# Patient Record
Sex: MALE | Race: WHITE | URBAN - METROPOLITAN AREA
[De-identification: names, ages, dates, MRNs, and addresses within clinical notes are randomized per-mention and may not be internally consistent; named-entity substitution may affect disease eponyms.]

---

## 2018-08-23 ENCOUNTER — TELEPHONE (OUTPATIENT)
Dept: BEHAVIORAL HEALTH | Facility: CLINIC | Age: 58
End: 2018-08-23

## 2018-08-23 ENCOUNTER — HOSPITAL ENCOUNTER (INPATIENT)
Facility: CLINIC | Age: 58
LOS: 2 days | Discharge: HOME | DRG: 885 | End: 2018-08-25
Attending: EMERGENCY MEDICINE | Admitting: PSYCHIATRY & NEUROLOGY
Payer: COMMERCIAL

## 2018-08-23 DIAGNOSIS — F41.9 ANXIETY: ICD-10-CM

## 2018-08-23 DIAGNOSIS — R45.851 SUICIDAL IDEATION: ICD-10-CM

## 2018-08-23 DIAGNOSIS — F10.10 ALCOHOL ABUSE, CONTINUOUS: ICD-10-CM

## 2018-08-23 DIAGNOSIS — F33.2 SEVERE EPISODE OF RECURRENT MAJOR DEPRESSIVE DISORDER, WITHOUT PSYCHOTIC FEATURES (H): ICD-10-CM

## 2018-08-23 LAB
AMPHETAMINES UR QL SCN: NEGATIVE
BARBITURATES UR QL: NEGATIVE
BENZODIAZ UR QL: NEGATIVE
CANNABINOIDS UR QL SCN: NEGATIVE
COCAINE UR QL: NEGATIVE
ETHANOL UR QL SCN: NEGATIVE
OPIATES UR QL SCN: NEGATIVE

## 2018-08-23 PROCEDURE — 99285 EMERGENCY DEPT VISIT HI MDM: CPT | Mod: 25 | Performed by: EMERGENCY MEDICINE

## 2018-08-23 PROCEDURE — 12400007 ZZH R&B MH INTERMEDIATE UMMC

## 2018-08-23 PROCEDURE — 80307 DRUG TEST PRSMV CHEM ANLYZR: CPT | Performed by: EMERGENCY MEDICINE

## 2018-08-23 PROCEDURE — 80320 DRUG SCREEN QUANTALCOHOLS: CPT | Performed by: EMERGENCY MEDICINE

## 2018-08-23 PROCEDURE — 99284 EMERGENCY DEPT VISIT MOD MDM: CPT | Mod: Z6 | Performed by: EMERGENCY MEDICINE

## 2018-08-23 PROCEDURE — 25000132 ZZH RX MED GY IP 250 OP 250 PS 637: Performed by: NURSE PRACTITIONER

## 2018-08-23 PROCEDURE — 90791 PSYCH DIAGNOSTIC EVALUATION: CPT

## 2018-08-23 RX ORDER — BUPROPION HYDROCHLORIDE 300 MG/1
300 TABLET ORAL DAILY
Status: ON HOLD | COMMUNITY
End: 2018-08-25

## 2018-08-23 RX ORDER — LISINOPRIL 20 MG/1
20 TABLET ORAL DAILY
Status: DISCONTINUED | OUTPATIENT
Start: 2018-08-23 | End: 2018-08-25 | Stop reason: HOSPADM

## 2018-08-23 RX ORDER — CITALOPRAM HYDROBROMIDE 20 MG/1
20 TABLET ORAL DAILY
Status: DISCONTINUED | OUTPATIENT
Start: 2018-08-24 | End: 2018-08-24

## 2018-08-23 RX ORDER — TRAZODONE HYDROCHLORIDE 50 MG/1
50 TABLET, FILM COATED ORAL
Status: DISCONTINUED | OUTPATIENT
Start: 2018-08-23 | End: 2018-08-25 | Stop reason: HOSPADM

## 2018-08-23 RX ORDER — DIPHENHYDRAMINE HCL 25 MG
25-50 CAPSULE ORAL
COMMUNITY

## 2018-08-23 RX ORDER — TRAZODONE HYDROCHLORIDE 50 MG/1
50 TABLET, FILM COATED ORAL AT BEDTIME
Status: DISCONTINUED | OUTPATIENT
Start: 2018-08-23 | End: 2018-08-25 | Stop reason: HOSPADM

## 2018-08-23 RX ORDER — LISINOPRIL 20 MG/1
20 TABLET ORAL DAILY
COMMUNITY

## 2018-08-23 RX ORDER — ALUMINA, MAGNESIA, AND SIMETHICONE 2400; 2400; 240 MG/30ML; MG/30ML; MG/30ML
30 SUSPENSION ORAL EVERY 4 HOURS PRN
Status: DISCONTINUED | OUTPATIENT
Start: 2018-08-23 | End: 2018-08-25 | Stop reason: HOSPADM

## 2018-08-23 RX ORDER — LANOLIN ALCOHOL/MO/W.PET/CERES
6 CREAM (GRAM) TOPICAL
Status: DISCONTINUED | OUTPATIENT
Start: 2018-08-23 | End: 2018-08-25 | Stop reason: HOSPADM

## 2018-08-23 RX ORDER — DIPHENHYDRAMINE HCL 25 MG
25-50 CAPSULE ORAL
Status: DISCONTINUED | OUTPATIENT
Start: 2018-08-23 | End: 2018-08-25 | Stop reason: HOSPADM

## 2018-08-23 RX ORDER — HYDROXYZINE HYDROCHLORIDE 25 MG/1
25 TABLET, FILM COATED ORAL EVERY 4 HOURS PRN
Status: DISCONTINUED | OUTPATIENT
Start: 2018-08-23 | End: 2018-08-25 | Stop reason: HOSPADM

## 2018-08-23 RX ORDER — CITALOPRAM HYDROBROMIDE 40 MG/1
40 TABLET ORAL EVERY MORNING
Status: ON HOLD | COMMUNITY
End: 2018-08-25

## 2018-08-23 RX ORDER — OLANZAPINE 10 MG/2ML
10 INJECTION, POWDER, FOR SOLUTION INTRAMUSCULAR
Status: DISCONTINUED | OUTPATIENT
Start: 2018-08-23 | End: 2018-08-25 | Stop reason: HOSPADM

## 2018-08-23 RX ORDER — OLANZAPINE 10 MG/1
10 TABLET ORAL
Status: DISCONTINUED | OUTPATIENT
Start: 2018-08-23 | End: 2018-08-25 | Stop reason: HOSPADM

## 2018-08-23 RX ORDER — BUPROPION HYDROCHLORIDE 150 MG/1
150 TABLET ORAL DAILY
Status: DISCONTINUED | OUTPATIENT
Start: 2018-08-24 | End: 2018-08-24

## 2018-08-23 RX ORDER — LORAZEPAM 0.5 MG/1
0.5 TABLET ORAL DAILY PRN
Status: ON HOLD | COMMUNITY
End: 2018-08-25

## 2018-08-23 RX ORDER — ACETAMINOPHEN 325 MG/1
650 TABLET ORAL EVERY 4 HOURS PRN
Status: DISCONTINUED | OUTPATIENT
Start: 2018-08-23 | End: 2018-08-25 | Stop reason: HOSPADM

## 2018-08-23 RX ADMIN — MELATONIN TAB 3 MG 6 MG: 3 TAB at 21:00

## 2018-08-23 RX ADMIN — LISINOPRIL 20 MG: 20 TABLET ORAL at 18:59

## 2018-08-23 RX ADMIN — TRAZODONE HYDROCHLORIDE 50 MG: 50 TABLET ORAL at 21:55

## 2018-08-23 ASSESSMENT — ACTIVITIES OF DAILY LIVING (ADL)
TRANSFERRING: 0-->INDEPENDENT
DRESS: SCRUBS (BEHAVIORAL HEALTH)
DRESS: 0-->INDEPENDENT
GROOMING: INDEPENDENT
TOILETING: 0-->INDEPENDENT
COGNITION: 0 - NO COGNITION ISSUES REPORTED
RETIRED_EATING: 0-->INDEPENDENT
AMBULATION: 0-->INDEPENDENT
SWALLOWING: 0-->SWALLOWS FOODS/LIQUIDS WITHOUT DIFFICULTY
RETIRED_COMMUNICATION: 0-->UNDERSTANDS/COMMUNICATES WITHOUT DIFFICULTY
ORAL_HYGIENE: INDEPENDENT
FALL_HISTORY_WITHIN_LAST_SIX_MONTHS: NO
BATHING: 0-->INDEPENDENT
LAUNDRY: WITH SUPERVISION

## 2018-08-23 NOTE — TELEPHONE ENCOUNTER
R:  Silviano/3B.  Notified unit @ 1:24 pm  Disposition @ 1:24 pm  Provider requesting pt placed on an emergency hold

## 2018-08-23 NOTE — ED NOTES
I have performed an in person assessment of the patient. Based on this assessment the patient no longer requires a one on one attendant at this point in time.    Elder Read MD  11:40 AM  August 23, 2018           Elder Read MD  08/23/18 1140

## 2018-08-23 NOTE — PROGRESS NOTES
[id number]   Patient Belongings   Did you bring any home meds/supplements to the hospital?  No   Patient Belongings cell phone/electronics;clothing;money (see comment);shoes;wallet   Belongings Search Yes   Clothing Search Yes   Second Staff Bill - Nurse      1. Black wallet  2. Brown belt  3. Black cell phone  4. Light brown/khaki cargo shorts  5. One  airplane ticket with paper casing    In Wallet:   Receipts   Business cards  [place] Drivers License  [employer] (buisness card)\  [employer] select club (card)  AAdvantage [employer] card  [place] gift card  Grey AAdvantage [employer] card  Mu[employer]oz [place] Card  Gold Ileana card  Triple AAA Card  Target gift card  Another Grey AAdvantage [employer] card  [employer] Blue card  Another triple AAA card  [employer]hta [employer] card (yellow)  Health partners card  Bconnect Marah card  Another Bconnected Marah card  Library Card  Blancadion Garduno Gold card    Money $33.68     (Moey and cards sent to security)     A               Admission:  I am responsible for any personal items that are not sent to the safe or pharmacy.  [facility] is not responsible for loss, theft or damage of any property in my possession.    Signature:  _________________________________ Date: _______  Time: _____                                              Staff Signature:  ____________________________ Date: ________  Time: _____      2nd Staff person, if patient is unable/unwilling to sign:    Signature: ________________________________ Date: ________  Time: _____     Discharge:  [facility] has returned all of my personal belongings:    Signature: _________________________________ Date: ________  Time: _____                                          Staff Signature:  ____________________________ Date: ________  Time: _____
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
## 2018-08-23 NOTE — PROGRESS NOTES
PT arrives on 3bw for admission. Pt has been feeling suicidal. Pt. Physical being and belongings checked in. Pt vitals, T 99.1, HR 67, /90, R 16. Report given to sandra ullao.

## 2018-08-23 NOTE — IP AVS SNAPSHOT
[facility]    [address]    [place] [address]    Phone:  [phone]                                       After Visit Summary   8/23/2018    Blanca Garduno    MRN: [id number]           After Visit Summary Signature Page     I have received my discharge instructions, and my questions have been answered. I have discussed any challenges I see with this plan with the nurse or doctor.    ..........................................................................................................................................  Patient/Patient Representative Signature      ..........................................................................................................................................  Patient Representative Print Name and Relationship to Patient    ..................................................               ................................................  Date                                            Time    ..........................................................................................................................................  Reviewed by Signature/Title    ...................................................              ..............................................  Date                                                            Time          22EPIC Rev 08/18

## 2018-08-23 NOTE — ED NOTES
Bed: ED11  Expected date: 8/23/18  Expected time: 11:10 AM  Means of arrival:   Comments:  Leanne 595 59yo M si on hold

## 2018-08-23 NOTE — IP AVS SNAPSHOT
MRN:[id number]                      After Visit Summary   8/23/2018    Blanca Garduno    MRN: [id number]           Thank you!     Thank you for choosing [facility] for your care. Our goal is always to provide you with excellent care.        Patient Information     Date Of Birth          1960        Designated Caregiver       Most Recent Value    Caregiver    Will someone help with your care after discharge? yes    Name of designated caregiver More    Phone number of caregiver [phone]    Caregiver address [place], [place]      About your hospital stay     You were admitted on:  August 23, 2018 [facility]    You were discharged on:  August 25, 2018       Who to Call     For medical emergencies, please call 911.  For non-urgent questions about your medical care, please call your primary care provider or clinic, [phone]          Attending Provider     Provider Specialty    Elder Read MD Emergency Medicine    [facility], Alden Duncan MD Psychiatry       Primary Care Provider Office Phone # Fax #    Kendell Jeancarlos Cristobal [phone]      Additional Services     Medication Therapy Management Referral       MTM referral reason            antidepressants: 3 or more prescribed     This service is designed to help you get the most from your medications.  A specially trained pharmacist will work closely with you and your doctors  to solve any problems related to your medications and to help you get the   best results from taking them.      The Medication Therapy Management staff will call you to schedule an appointment.                  Further instructions from your care team        Behavioral Discharge Planning and Instructions      Summary:  You were admitted on 8/23/2018  due to Suicidal Ideations.  You were treated by Dr. Alden Velasquez MD and discharged on 08/25/18 from [facility] to Home      Principal Diagnosis:  Severe episode of  recurrent major depressive disorder, without psychotic features (H)      Health Care Follow-up Appointments:   Because you are being discharged on a weekend day, you do not have any follow up appointments in place.  You are encouraged to follow up with your provider Daxa Sandy within the next two weeks for a medication evaluation follow up.      It is also strongly recommended that you commence work with an outpatient mental health therapist. At the time you schedule the follow up appointment with your medication manager, you can request an appointment with this type of provider as well.     PLEASE FOLLOW UP WITH YOUR PCP TO HAVE A FASTING LIPID PANEL REPEATED IN 1-2 WEEKS.       Lifestyle Adjustment:   1. Adjust your lifestyle to get enough sleep, relaxation, exercise and good nutrition.  Continue to develop healthy coping skills to decrease stress and promote a healthy and sober lifestyle.  2. Abstain from all substances of abuse.  3. Take medications as prescribed.  Please work with your doctor to discuss any concerns you have with your medications or side effects you may be experiencing.  4. Follow up with appointments as scheduled.      General Medication Instructions:   See your medication sheet(s) for instructions.   Take all medicines as directed.  Make no changes unless your doctor suggests them.   Go to all your doctor visits.  Be sure to have all your required lab tests.     Major Treatments, Procedures and Findings:  You were provided with: a psychiatric assessment, assessed for medical stability, medication evaluation and/or management, group therapy and milieu management    Symptoms to Report: feeling more aggressive, increased confusion, losing more sleep, mood getting worse or thoughts of suicide    Early warning signs can include: increased depression or anxiety sleep disturbances increased thoughts or behaviors of suicide or self-harm  increased unusual thinking, such  "as paranoia or hearing voices    Safety and Wellness:  Take all medicines as directed.  Make no changes unless your doctor suggests them.      Follow treatment recommendations.  Refrain from alcohol and non-prescribed drugs.  Ask your support system to help you reduce your access to items that could harm yourself or others. If there is a concern for safety, call 911.    Resources:   Crisis Intervention: 995.120.7238 or 465-582-8239 (TTY: 694.119.2549).  Call anytime for help.  National [place] on Mental Illness (www.[place].luciana.org): 459.865.7310 or 913-422-8868.  Alcoholics Anonymous (www.alcoholics-anonymous.org): Check your phone book for your local chapter.  Suicide Awareness Voices of Education (SAVE) (www.save.org): 456-059-QWOE (4652)  National Suicide Prevention Line (www.mentalhealthmn.org): 336-499-JVSM (9273)  Mental Health Consumer/Survivor Network of [place] (www.mhcsn.net): 672.754.3175 or 931-813-7543  Mental Health Association of [place] (www.mentalhealth.org): 768.730.1938 or 429-302-9239  Self- Management and Recovery Training., [employer]-- Toll free: 531.874.8825  www.Zillow.org  [facility] Crisis Response 369 026-0312  Text 4 Life: txt \"LIFE\" to 96800 for immediate support and crisis intervention  Crisis text line: Text \"[place]\" to 554975. Free, confidential, 24/7.  Crisis Intervention: 836.646.1879 or 297-439-9673. Call anytime for help.     The treatment team has appreciated the opportunity to work with you.     If you have any questions or concerns our unit number is [phone]             Pending Results     Date and Time Order Name Status Description    8/24/2018 1158 EKG 12-lead, complete Preliminary             Statement of Approval     Ordered          08/25/18 0834  I have reviewed and agree with all the recommendations and orders detailed in this document.  EFFECTIVE NOW     Approved and electronically signed by:  Alden Shore MD             Admission Information     Date & Time " "Provider Department Dept. Phone    2018 [facility] [phone]      Your Vitals Were     Blood Pressure Pulse Temperature Respirations Height Weight    126/79 54 96.9  F (36.1  C) (Tympanic) 16 1.778 m (5' 10\") 93.3 kg (205 lb 11.2 oz)    Pulse Oximetry BMI (Body Mass Index)                95% 29.51 kg/m2          [employer] Information     [employer] lets you send messages to your doctor, view your test results, renew your prescriptions, schedule appointments and more. To sign up, go to [email or web address] . Click on \"Log in\" on the left side of the screen, which will take you to the Welcome page. Then click on \"Sign up Now\" on the right side of the page.     You will be asked to enter the access code listed below, as well as some personal information. Please follow the directions to create your username and password.     Your access code is: [email or web address]  Expires: 2018  8:48 AM     Your access code will  in 90 days. If you need help or a new code, please call your [facility] clinic or [phone].        Care EveryWhere ID     This is your Care EveryWhere ID. This could be used by other organizations to access your [facility] medical records  [id number]        Equal Access to Services     JEANETH ARENAS : Hadii bran Hathaway, waaxda lulashelladaha, qaybta kaalherbert lincoln, rohini garzon . [facility] [phone].    ATENCIÓN: Si habla español, tiene a peng disposición servicios gratuitos de asistencia lingüística. Toyin al [phone].    We comply with applicable federal civil rights laws and [place] laws. We do not discriminate on the basis of race, color, national origin, age, disability, sex, sexual orientation, or gender identity.               Review of your medicines      START taking        Dose / Directions    cholecalciferol 2000 units tablet   Used for:  Severe episode of recurrent major depressive disorder, without psychotic features " (H)        Dose:  2000 Units   Take 2,000 Units by mouth daily   Quantity:  30 tablet   Refills:  1       FLUoxetine 20 MG capsule   Commonly known as:  PROzac   Used for:  Severe episode of recurrent major depressive disorder, without psychotic features (H)        Dose:  20 mg   Take 1 capsule (20 mg) by mouth daily   Quantity:  60 capsule   Refills:  1       melatonin 3 MG tablet        Dose:  6 mg   Take 2 tablets (6 mg) by mouth nightly as needed for sleep   Refills:  0       traZODone 50 MG tablet   Commonly known as:  DESYREL   Used for:  Severe episode of recurrent major depressive disorder, without psychotic features (H)        Dose:  50 mg   Take 1 tablet (50 mg) by mouth At Bedtime   Quantity:  30 tablet   Refills:  1         CONTINUE these medicines which have NOT CHANGED        Dose / Directions    diphenhydrAMINE 25 MG capsule   Commonly known as:  BENADRYL        Dose:  25-50 mg   Take 25-50 mg by mouth nightly as needed for sleep   Refills:  0       lisinopril 20 MG tablet   Commonly known as:  PRINIVIL/ZESTRIL        Dose:  20 mg   Take 20 mg by mouth daily   Refills:  0         STOP taking     buPROPion 300 MG 24 hr tablet   Commonly known as:  WELLBUTRIN XL           citalopram 40 MG tablet   Commonly known as:  celeXA           LORazepam 0.5 MG tablet   Commonly known as:  ATIVAN                Where to get your medicines      These medications were sent to [facility] [place], [place] - [address]  [address] [address]     Phone:  [phone]     cholecalciferol 2000 units tablet    FLUoxetine 20 MG capsule    traZODone 50 MG tablet                Protect others around you: Learn how to safely use, store and throw away your medicines at www.disposemymeds.org.             Medication List: This is a list of all your medications and when to take them. Check marks below indicate your daily home schedule. Keep this list as a reference.      Medications            Morning Afternoon Evening Bedtime As Needed    cholecalciferol 2000 units tablet   Take 2,000 Units by mouth daily   Last time this was given:  2,000 Units on 8/25/2018  8:44 AM                                   diphenhydrAMINE 25 MG capsule   Commonly known as:  BENADRYL   Take 25-50 mg by mouth nightly as needed for sleep                                   FLUoxetine 20 MG capsule   Commonly known as:  PROzac   Take 1 capsule (20 mg) by mouth daily   Last time this was given:  20 mg on 8/25/2018  8:44 AM                                   lisinopril 20 MG tablet   Commonly known as:  PRINIVIL/ZESTRIL   Take 20 mg by mouth daily   Last time this was given:  20 mg on 8/24/2018  9:20 PM                                   melatonin 3 MG tablet   Take 2 tablets (6 mg) by mouth nightly as needed for sleep   Last time this was given:  6 mg on 8/24/2018  9:20 PM                                   traZODone 50 MG tablet   Commonly known as:  DESYREL   Take 1 tablet (50 mg) by mouth At Bedtime   Last time this was given:  50 mg on 8/24/2018 10:04 PM

## 2018-08-23 NOTE — ED NOTES
ED to Behavioral Floor Handoff    SITUATION  Blanca Garduno is a 58 year old male who speaks English and lives in a home with family members The patient arrived in the ED by ambulance from MD office with a complaint of Suicidal (arrived via ems. pt has been dealing w depression for 38 years. since 2008, pt has had major depression after deaths in family.  [facility] and had bad experience with [facility]. this kept him from following up with mental health issues. therapy isnt currently helping. Two months ago, SI became strong, plan to OD on pills. Pt's new psychiatrist put pt on a transport hold to be seen here.)  .The patient's current symptoms started/worsened 2 month(s) ago and during this time the symptoms have increased.   In the ED, pt was diagnosed with   Final diagnoses:   Suicidal ideation   Severe episode of recurrent major depressive disorder, without psychotic features (H)        Initial vitals were: BP: (!) 155/102  Pulse: 73  Temp: 97.6  F (36.4  C)  Resp: 20  SpO2: 96 %   --------  Is the patient diabetic? No   If yes, last blood glucose? --     If yes, was this treated in the ED? --  --------  Is the patient inebriated (ETOH) No or Impaired on other substances? No  MSSA done? N/A  Last MSSA score: --    Were withdrawal symptoms treated? N/A  Does the patient have a seizure history? No. If yes, date of most recent seizure--  --------  Is the patient patient experiencing suicidal ideation? Pt having SI with plan to overdose on pills    Homicidal ideation? denies current or recent homicidal ideation or behaviors.    Self-injurious behavior/urges? denies current or recent self injurious behavior or ideation.  ------  Was pt aggressive in the ED No  Was a code called No  Is the pt now cooperative? Yes  -------  Meds given in ED: Medications - No data to display   Family present during ED course? No  Family currently present? No    BACKGROUND  Does the patient have a cognitive impairment or  developmental disability? No  Allergies: No Known Allergies.   Social demographics are   Social History     Social History     Marital status:      Spouse name: N/A     Number of children: N/A     Years of education: N/A     Social History Main Topics     Smoking status: Never Smoker     Smokeless tobacco: Never Used     Alcohol use Yes      Comment: 2-4 beers daily     Drug use: No     Sexual activity: Not Asked     Other Topics Concern     None     Social History Narrative        ASSESSMENT  Labs results   Labs Ordered and Resulted from Time of ED Arrival Up to the Time of Departure from the ED   DRUG ABUSE SCREEN 6 CHEM DEP URINE ([facility])      Imaging Studies: No results found for this or any previous visit (from the past 24 hour(s)).   Most recent vital signs BP (!) 155/102  Pulse 73  Temp 97.6  F (36.4  C) (Oral)  Resp 20  SpO2 96%   Abnormal labs/tests/findings requiring intervention:---   Pain control: pt had none  Nausea control: pt had none    RECOMMENDATION  Are any infection precautions needed (MRSA, VRE, etc.)? No If yes, what infection? --  ---  Does the patient have mobility issues? independently. If yes, what device does the pt use? ---  ---  Is patient on 72 hour hold or commitment? Yes If on 72 hour hold, have hold and rights been given to patient? Yes  Are admitting orders written if after 10 p.m. ?N/A  Tasks needing to be completed:---     Angelica Hazel   [facility] [phone] [place] [facility]   [phone] [facility]

## 2018-08-23 NOTE — PHARMACY-ADMISSION MEDICATION HISTORY
Admission medication history interview status for the 8/23/2018 admission is complete. See Epic admission navigator for allergy information, pharmacy, prior to admission medications and immunization status.     Medication history interview sources:  patient, [facility], [place] , [facility] in [place] [phone]    Changes made to PTA medication list (reason)  Added: Benadryl PRN  Deleted: none  Changed: added instructions for the bupropion, citalopram, lisinopril, and Ativan    Additional medication history information (including reliability of information, actions taken by pharmacist):  -Patient was a good historian.  -Verified all medications with [facility] besides the Benadryl that he buys over the counter.   -[place] : Ativan 0.5mg tablet last filled on 7/13/18 for #10, 10 days supply.    Prior to Admission medications    Medication Sig Last Dose Taking? Auth Provider   buPROPion (WELLBUTRIN XL) 300 MG 24 hr tablet Take 300 mg by mouth daily 8/18/2018 at Unknown time Yes Unknown, Entered By History   citalopram (CELEXA) 40 MG tablet Take 40 mg by mouth every morning 8/18/2018 at Unknown time Yes Unknown, Entered By History   diphenhydrAMINE (BENADRYL) 25 MG capsule Take 25-50 mg by mouth nightly as needed for sleep 8/22/2018 at 2200 Yes Unknown, Entered By History   lisinopril (PRINIVIL/ZESTRIL) 20 MG tablet Take 20 mg by mouth daily 8/18/2018 at Unknown time Yes Unknown, Entered By History   LORazepam (ATIVAN) 0.5 MG tablet Take 0.5 mg by mouth daily as needed (for flying) Past Month at Unknown time Yes Unknown, Entered By History     Medication history completed by:   Silke Cramer, PharmD, BCPS

## 2018-08-23 NOTE — TELEPHONE ENCOUNTER
S: Pt is a 59 yo male in the [facility] ED for SI    B: Pt recently started OP and [facility] through [employer]. Did a tele med conference and he disclosed that he is suicidal. Pt states that he's been suicidal for the past couple of years, even wrote suicide notes to family but never disclosed them. Pt currently wants to overdose on medications. Pt was a [facility] about 3 years ago for similar issues. Pt is Wellbutrin and Celexa. Pt drinks 4 beers daily and sleeps about 16 hours daily. Pt initially didn't want admission and was place on a hold. Pt now signed in voluntarily. Pt is calm and cooperative.     A: Pt is voluntary. Medically cleared

## 2018-08-24 LAB
ALBUMIN SERPL-MCNC: 3.9 G/DL (ref 3.4–5)
ALBUMIN UR-MCNC: NEGATIVE MG/DL
ALP SERPL-CCNC: 58 U/L (ref 40–150)
ALT SERPL W P-5'-P-CCNC: 25 U/L (ref 0–70)
ANION GAP SERPL CALCULATED.3IONS-SCNC: 8 MMOL/L (ref 3–14)
APPEARANCE UR: CLEAR
AST SERPL W P-5'-P-CCNC: 18 U/L (ref 0–45)
BASOPHILS # BLD AUTO: 0 10E9/L (ref 0–0.2)
BASOPHILS NFR BLD AUTO: 0.7 %
BILIRUB SERPL-MCNC: 0.6 MG/DL (ref 0.2–1.3)
BILIRUB UR QL STRIP: NEGATIVE
BUN SERPL-MCNC: 15 MG/DL (ref 7–30)
CALCIUM SERPL-MCNC: 9 MG/DL (ref 8.5–10.1)
CHLORIDE SERPL-SCNC: 108 MMOL/L (ref 94–109)
CHOLEST SERPL-MCNC: 193 MG/DL
CO2 SERPL-SCNC: 27 MMOL/L (ref 20–32)
COLOR UR AUTO: YELLOW
CREAT SERPL-MCNC: 0.97 MG/DL (ref 0.66–1.25)
DEPRECATED CALCIDIOL+CALCIFEROL SERPL-MC: 17 UG/L (ref 20–75)
DIFFERENTIAL METHOD BLD: NORMAL
EOSINOPHIL # BLD AUTO: 0.1 10E9/L (ref 0–0.7)
EOSINOPHIL NFR BLD AUTO: 1.5 %
ERYTHROCYTE [DISTWIDTH] IN BLOOD BY AUTOMATED COUNT: 12.8 % (ref 10–15)
FOLATE SERPL-MCNC: 26 NG/ML
GFR SERPL CREATININE-BSD FRML MDRD: 79 ML/MIN/1.7M2
GLUCOSE SERPL-MCNC: 87 MG/DL (ref 70–99)
GLUCOSE UR STRIP-MCNC: NEGATIVE MG/DL
HCT VFR BLD AUTO: 42 % (ref 40–53)
HDLC SERPL-MCNC: 50 MG/DL
HGB BLD-MCNC: 14 G/DL (ref 13.3–17.7)
HGB UR QL STRIP: NEGATIVE
HYALINE CASTS #/AREA URNS LPF: 2 /LPF (ref 0–2)
IMM GRANULOCYTES # BLD: 0 10E9/L (ref 0–0.4)
IMM GRANULOCYTES NFR BLD: 0.3 %
KETONES UR STRIP-MCNC: NEGATIVE MG/DL
LDLC SERPL CALC-MCNC: 116 MG/DL
LEUKOCYTE ESTERASE UR QL STRIP: NEGATIVE
LYMPHOCYTES # BLD AUTO: 2 10E9/L (ref 0.8–5.3)
LYMPHOCYTES NFR BLD AUTO: 32.8 %
MCH RBC QN AUTO: 29.7 PG (ref 26.5–33)
MCHC RBC AUTO-ENTMCNC: 33.3 G/DL (ref 31.5–36.5)
MCV RBC AUTO: 89 FL (ref 78–100)
MONOCYTES # BLD AUTO: 0.5 10E9/L (ref 0–1.3)
MONOCYTES NFR BLD AUTO: 7.7 %
MUCOUS THREADS #/AREA URNS LPF: PRESENT /LPF
NEUTROPHILS # BLD AUTO: 3.4 10E9/L (ref 1.6–8.3)
NEUTROPHILS NFR BLD AUTO: 57 %
NITRATE UR QL: NEGATIVE
NONHDLC SERPL-MCNC: 143 MG/DL
NRBC # BLD AUTO: 0 10*3/UL
NRBC BLD AUTO-RTO: 0 /100
PH UR STRIP: 6 PH (ref 5–7)
PLATELET # BLD AUTO: 228 10E9/L (ref 150–450)
POTASSIUM SERPL-SCNC: 4.1 MMOL/L (ref 3.4–5.3)
PROT SERPL-MCNC: 7.3 G/DL (ref 6.8–8.8)
RBC # BLD AUTO: 4.71 10E12/L (ref 4.4–5.9)
RBC #/AREA URNS AUTO: 2 /HPF (ref 0–2)
SODIUM SERPL-SCNC: 143 MMOL/L (ref 133–144)
SOURCE: ABNORMAL
SP GR UR STRIP: 1.01 (ref 1–1.03)
SQUAMOUS #/AREA URNS AUTO: <1 /HPF (ref 0–1)
TRIGL SERPL-MCNC: 133 MG/DL
TSH SERPL DL<=0.005 MIU/L-ACNC: 1.58 MU/L (ref 0.4–4)
UROBILINOGEN UR STRIP-MCNC: NORMAL MG/DL (ref 0–2)
VIT B12 SERPL-MCNC: 511 PG/ML (ref 193–986)
WBC # BLD AUTO: 6 10E9/L (ref 4–11)
WBC #/AREA URNS AUTO: 1 /HPF (ref 0–5)

## 2018-08-24 PROCEDURE — 80061 LIPID PANEL: CPT | Performed by: NURSE PRACTITIONER

## 2018-08-24 PROCEDURE — 99207 ZZC CONSULT E&M CHANGED TO INITIAL LEVEL: CPT | Performed by: PHYSICIAN ASSISTANT

## 2018-08-24 PROCEDURE — 99221 1ST HOSP IP/OBS SF/LOW 40: CPT | Performed by: PHYSICIAN ASSISTANT

## 2018-08-24 PROCEDURE — 12400007 ZZH R&B MH INTERMEDIATE UMMC

## 2018-08-24 PROCEDURE — G0177 OPPS/PHP; TRAIN & EDUC SERV: HCPCS

## 2018-08-24 PROCEDURE — 25000132 ZZH RX MED GY IP 250 OP 250 PS 637: Performed by: NURSE PRACTITIONER

## 2018-08-24 PROCEDURE — 36415 COLL VENOUS BLD VENIPUNCTURE: CPT | Performed by: NURSE PRACTITIONER

## 2018-08-24 PROCEDURE — 80053 COMPREHEN METABOLIC PANEL: CPT | Performed by: NURSE PRACTITIONER

## 2018-08-24 PROCEDURE — 82306 VITAMIN D 25 HYDROXY: CPT | Performed by: NURSE PRACTITIONER

## 2018-08-24 PROCEDURE — 84443 ASSAY THYROID STIM HORMONE: CPT | Performed by: NURSE PRACTITIONER

## 2018-08-24 PROCEDURE — 85025 COMPLETE CBC W/AUTO DIFF WBC: CPT | Performed by: NURSE PRACTITIONER

## 2018-08-24 PROCEDURE — 81001 URINALYSIS AUTO W/SCOPE: CPT | Performed by: NURSE PRACTITIONER

## 2018-08-24 PROCEDURE — 82607 VITAMIN B-12: CPT | Performed by: NURSE PRACTITIONER

## 2018-08-24 PROCEDURE — 25000132 ZZH RX MED GY IP 250 OP 250 PS 637: Performed by: PHYSICIAN ASSISTANT

## 2018-08-24 PROCEDURE — 25000132 ZZH RX MED GY IP 250 OP 250 PS 637: Performed by: PSYCHIATRY & NEUROLOGY

## 2018-08-24 PROCEDURE — 99222 1ST HOSP IP/OBS MODERATE 55: CPT | Mod: AI | Performed by: PSYCHIATRY & NEUROLOGY

## 2018-08-24 PROCEDURE — 93005 ELECTROCARDIOGRAM TRACING: CPT

## 2018-08-24 PROCEDURE — 82746 ASSAY OF FOLIC ACID SERUM: CPT | Performed by: NURSE PRACTITIONER

## 2018-08-24 PROCEDURE — 93010 ELECTROCARDIOGRAM REPORT: CPT | Performed by: INTERNAL MEDICINE

## 2018-08-24 RX ADMIN — MELATONIN TAB 3 MG 6 MG: 3 TAB at 21:20

## 2018-08-24 RX ADMIN — ACETAMINOPHEN 650 MG: 325 TABLET, FILM COATED ORAL at 14:35

## 2018-08-24 RX ADMIN — LISINOPRIL 20 MG: 20 TABLET ORAL at 21:20

## 2018-08-24 RX ADMIN — TRAZODONE HYDROCHLORIDE 50 MG: 50 TABLET ORAL at 22:04

## 2018-08-24 RX ADMIN — FLUOXETINE 20 MG: 20 CAPSULE ORAL at 11:20

## 2018-08-24 ASSESSMENT — ACTIVITIES OF DAILY LIVING (ADL)
ORAL_HYGIENE: INDEPENDENT
GROOMING: INDEPENDENT
DRESS: INDEPENDENT

## 2018-08-24 NOTE — PROGRESS NOTES
"Pt's pulse noted to be in the 50's this shift ( both by machine and apical). Lisinopril was held until order parameters received from medical team. Per Rod/PA there is no need for pulse parameter on this medication. Blood pressure parameter in place. Lisinopril was rescheduled for HS as this is when he had his dose yesterday.   Pt reports that he is unaware of having a history of a low pulse and denies symptoms.  Pt is currently in the conference room meeting with his daughters and wife.     Pt identifies \"I was in a pretty crappy mood last night\". Pt reports mood has improved. Pt enjoyed visit from his wife and daughters early this afternoon.    1430- Pt requested and received prn tylenol 650 mg for neck pain. Pt believes he may have slept wrong. Pt denies current SI/SIB and hopes to go home tomorrow.   "
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
## 2018-08-24 NOTE — PROGRESS NOTES
Pt reported having a good day and experiencing a 2/10 on depression (10 being the worst). Pt said yesterday, the day he was admitted, was a really bad day and he was not doing well. Pt said today was a much better day. Pt denied SI/SIB and showed insight into his illness. Pt hopes to discharge tomorrow.         08/24/18 1506   Behavioral Health   Hallucinations denies / not responding to hallucinations   Thinking distractable   Orientation time: oriented;date: oriented;place: oriented;person: oriented   Memory baseline memory   Insight admits / accepts   Eye Contact at examiner   Affect full range affect   Mood mood is calm   Physical Appearance/Attire disheveled   Hygiene (adequate)   1. Wish to be Dead No   2. Non-Specific Active Suicidal Thoughts  No

## 2018-08-24 NOTE — PLAN OF CARE
"Problem: Patient Care Overview  Goal: Individualization & Mutuality  Personal Plan of Care    Reasons you are in the Hospital  1.  Severe Depression  2.  Thoughts of suicide  3.  4.    Goals for Discharge   1.  Get on medication that will be effective  2.  Develop strategies to deal with \"Sudden crashes\"  3.      s      "

## 2018-08-24 NOTE — PLAN OF CARE
Problem: Patient Care Overview  Goal: Team Discussion  Team Plan:    BEHAVIORAL TEAM DISCUSSION    Continued Stay Criteria/Rationale: Patient admitted on 72 hour hold due to suicidal ideation  Plan: Psychiatric Assessment. Medication Management. Therapeutic Mileu. Individual and group support.  Participants: Alden Shore MD, Pearl Garrido RN, Mónica Stanford OT  Summary/Recommendation: The plan is to assess the patient for mental health and medication needs.  The patient will be prescribed medications to treat the identified symptoms.  Upon discharge the patient will be referred to services as appropriate based on the assessment.  Medical/Physical: Per internal med consult  Progress: Initial assessment  Precautions:   Behavioral Orders   Procedures     Code 1 - Restrict to Unit     Routine Programming     As clinically indicated     Status 15     Every 15 minutes.     Suicide precautions     Patients on Suicide Precautions should have a Combination Diet ordered that includes a Diet selection(s) AND a Behavioral Tray selection for Safe Tray - with utensils, or Safe Tray - NO utensils               Problem: General Plan of Care (Inpatient Behavioral)  Goal: Team Discussion  Team Plan:    BEHAVIORAL TEAM DISCUSSION    Continued Stay Criteria/Rationale: Patient admitted on 72 hour hold due to suicidal ideation  Plan: Psychiatric Assessment. Medication Management. Therapeutic Mileu. Individual and group support.  Participants: Alden Shore MD, Pearl Garrido RN, Seble Pfeiffer and Mónica Monique OT  Summary/Recommendation: The plan is to assess the patient for mental health and medication needs.  The patient will be prescribed medications to treat the identified symptoms.  Upon discharge the patient will be referred to services as appropriate based on the assessment.  Medical/Physical: Per internal med consult  Progress: Initial assessment  Precautions:   Behavioral Orders    Procedures     Code 1 - Restrict to Unit     Routine Programming     As clinically indicated     Status 15     Every 15 minutes.     Suicide precautions     Patients on Suicide Precautions should have a Combination Diet ordered that includes a Diet selection(s) AND a Behavioral Tray selection for Safe Tray - with utensils, or Safe Tray - NO utensils

## 2018-08-24 NOTE — PLAN OF CARE
Problem: Occupational Therapy Goals (Adult)  Goal: Occupational Therapy Goals  Will participate in setting OT goals     Attended 1 of 3 OT groups. He was quick to be involved, pleasant and social with others. He learned and participated in an activity focused on utilizing problem solving skills using visuospatial concepts. With occasional cues he followed through successfully in finding solutions and becam quicker in problem solving with independence. Pt will be given to complete a written self assessment. OT purpose was explained with the value of having involvement in treatment plan, and provided options to meet self identified goals. Will assess further and set OT goals with additional attendance.

## 2018-08-24 NOTE — PLAN OF CARE
Problem: General Plan of Care (Inpatient Behavioral)  Goal: Individualization/Patient Specific Goal (IP Behavioral)  The patient and/or their representative will achieve their patient-specific goals related to the plan of care.    The patient-specific goals include:  Illness Management Recovery model: Objectives    Patient will identify reason(s) for hospitalization from their perspective.  Patient will identify a minimum of three goals for discharge.  Patient will identify a minimum of three triggers that may increase their symptoms.  Patient will identify a minimum of three coping skills they can do to stay well.   Patient will identify their support system to demonstrate readiness for discharge.  Illness Management Recovery model:  Feedback.    Patient identified the following people they would like to receive feedback from if/when they see early warning signs:    Friend(s)     Family(s):     Partner/Spouse: Wife More    Support Group Member(s):  Therapist    Co-Worker(s):
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
## 2018-08-24 NOTE — CONSULTS
Consult Date:  08/24/2018      HISTORY OF PRESENT ILLNESS:  The patient is a 58-year-old male admitted to station 3B for worsening depression and suicidal ideation.  An Internal Medicine consultation was ordered by Dr. Shore to assess medical problems including hypertension.  At this time, the patient denies acute physical concerns including fever, chills, chest pain, shortness of breath, abdominal pain, nausea, bowel or bladder concerns.      PAST MEDICAL HISTORY:   1.  Depression and other psych conditions per Dr. Shore.     2.  Hypertension.   3.  Denies a history of other chronic medical problems including cardiopulmonary disease and diabetes.      PAST SURGICAL HISTORY:  None.      ADMISSION MEDICATIONS:  Reviewed and listed in the medication reconciliation list.      ALLERGIES:  NO KNOWN DRUG ALLERGIES.      SOCIAL HISTORY:  .  Two children.  Lives in [place].  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  Denies smoking cigarettes.  Denies any problem with alcohol or drugs.      FAMILY HISTORY:  Reviewed and noncontributory.      REVIEW OF SYSTEMS:  Ten-point review of systems negative except as stated above in the history of present illness.      PHYSICAL EXAMINATION:   GENERAL:  Pleasant gentleman in no acute distress.   VITAL SIGNS:  Stable.  Pulse in the mid-50s.   HEENT:  Negative.   NECK:  Supple.  No cervical lymphadenopathy or thyromegaly.   LUNGS:  Clear.   CARDIOVASCULAR:  Bradycardic yet regular, no murmurs appreciated.   ABDOMEN:  Soft, nontender.   EXTREMITIES:  No edema.   SKIN:  No rashes noted on exposed areas.   NEUROLOGIC:  He is awake, alert and oriented x 3.  Motor strength is symmetric.  He is not tremulous.      LABORATORY DATA:  .  Otherwise, CBC, comprehensive metabolic panel, rest of the lipid panel and TSH are essentially normal.  Urine drug screen negative.      IMPRESSION:   1.  Depression per Dr. Shore.   2.  Hypertension, stable.   3.  Bradycardia, likely physiologic, patient  asymptomatic.   4.  Mild dyslipidemia.      PLAN:  Will obtain a routine baseline EKG given the patient's recurrent bradycardia despite him being asymptomatic.  His blood pressures will be monitored closely.  Continue with prior to admission lisinopril as ordered with parameters to hold.  No other medical intervention indicated at this time.  The patient is medically stable.  At the time of discharge, he should be instructed to follow up with his family physician in 1-2 weeks for a repeat fasting lipid panel.   I will be happy to follow up and see him again in the interim for any intercurrent medical issues.      Thank you for this consultation.         GAY GAXIOLA PA-C             D: 2018   T: 2018   MT: MARVIN      Name:     PHILLIP FREIRE   MRN:      [id number]        Account:       [id number]   :      1960           Consult Date:  2018      Document: [id number]       cc: Alden Cristobal MD
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
## 2018-08-24 NOTE — PROGRESS NOTES
"Notification of Family:  Writer asked Pt if he wanted anyone contacted and informed of his admission here. Pt declined, \"My wife and family knows I'm here, thank you though\".   "

## 2018-08-25 VITALS
HEIGHT: 70 IN | DIASTOLIC BLOOD PRESSURE: 79 MMHG | OXYGEN SATURATION: 95 % | RESPIRATION RATE: 16 BRPM | TEMPERATURE: 96.9 F | HEART RATE: 54 BPM | SYSTOLIC BLOOD PRESSURE: 126 MMHG | WEIGHT: 205.7 LBS | BODY MASS INDEX: 29.45 KG/M2

## 2018-08-25 PROCEDURE — 25000132 ZZH RX MED GY IP 250 OP 250 PS 637: Performed by: NURSE PRACTITIONER

## 2018-08-25 PROCEDURE — 25000132 ZZH RX MED GY IP 250 OP 250 PS 637: Performed by: PSYCHIATRY & NEUROLOGY

## 2018-08-25 PROCEDURE — 99238 HOSP IP/OBS DSCHRG MGMT 30/<: CPT | Performed by: PSYCHIATRY & NEUROLOGY

## 2018-08-25 RX ORDER — LANOLIN ALCOHOL/MO/W.PET/CERES
6 CREAM (GRAM) TOPICAL
COMMUNITY
Start: 2018-08-25 | End: 2018-08-27

## 2018-08-25 RX ORDER — TRAZODONE HYDROCHLORIDE 50 MG/1
50 TABLET, FILM COATED ORAL AT BEDTIME
Qty: 30 TABLET | Refills: 1 | Status: SHIPPED | OUTPATIENT
Start: 2018-08-25 | End: 2019-01-22

## 2018-08-25 RX ORDER — TRAZODONE HYDROCHLORIDE 50 MG/1
50 TABLET, FILM COATED ORAL AT BEDTIME
Qty: 90 TABLET | COMMUNITY
Start: 2018-08-25 | End: 2018-08-25

## 2018-08-25 RX ADMIN — FLUOXETINE 20 MG: 20 CAPSULE ORAL at 08:44

## 2018-08-25 RX ADMIN — VITAMIN D, TAB 1000IU (100/BT) 2000 UNITS: 25 TAB at 08:44

## 2018-08-25 RX ADMIN — ACETAMINOPHEN 650 MG: 325 TABLET, FILM COATED ORAL at 10:16

## 2018-08-25 ASSESSMENT — ACTIVITIES OF DAILY LIVING (ADL)
ORAL_HYGIENE: INDEPENDENT
GROOMING: INDEPENDENT
DRESS: INDEPENDENT;STREET CLOTHES;SCRUBS (BEHAVIORAL HEALTH)

## 2018-08-25 NOTE — PROGRESS NOTES
Pt was discharged home at 1315. Pt was transported by his wife/No and daughters.   All belongings were sent with patient including AVS and discharge medications.  AVS was reviewed with patient. Pt is aware that he needs to follow up with PCP in 1-2 weeks for have repeat fasting lipids. Pt and wife a ware that it is recommended that they follow up with PCP to find a psychiatric provider closer to their home.   Pt denied SI/SIB at time of discharge. Pt denies having access to guns. Pt identified walking his dogs as a coping skill and that his wife is his best support.

## 2018-08-25 NOTE — PROGRESS NOTES
Patient attended community meeting and contributed well in fact he provided a distraction for another patient that was constantly intrusive with good results and allowed by this writer as constructive use of community interaction in a collaborative and constructive way. Patient had a good visit with his guests and had a generally positive demeanor.  Patient denies SI and SIB with minimal depressive feelings and in a calm mood. Patient looks forward to discharge and feels ready to go as long as his doctor feels his medicine is right.  Social on the unit and engaging with both peers and staff.
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
## 2018-08-25 NOTE — DISCHARGE INSTRUCTIONS
Behavioral Discharge Planning and Instructions      Summary:  You were admitted on 8/23/2018  due to Suicidal Ideations.  You were treated by Dr. Alden Velasquez MD and discharged on 08/25/18 from Station 3B to Home      Principal Diagnosis:  Severe episode of recurrent major depressive disorder, without psychotic features (H)      Health Care Follow-up Appointments:   Because you are being discharged on a weekend day, you do not have any follow up appointments in place.  You are encouraged to follow up with your provider Daxa Sandy within the next two weeks for a medication evaluation follow up.      It is also strongly recommended that you commence work with an outpatient mental health therapist. At the time you schedule the follow up appointment with your medication manager, you can request an appointment with this type of provider as well.     PLEASE FOLLOW UP WITH YOUR PCP TO HAVE A FASTING LIPID PANEL REPEATED IN 1-2 WEEKS.       Lifestyle Adjustment:   1. Adjust your lifestyle to get enough sleep, relaxation, exercise and good nutrition.  Continue to develop healthy coping skills to decrease stress and promote a healthy and sober lifestyle.  2. Abstain from all substances of abuse.  3. Take medications as prescribed.  Please work with your doctor to discuss any concerns you have with your medications or side effects you may be experiencing.  4. Follow up with appointments as scheduled.      General Medication Instructions:   See your medication sheet(s) for instructions.   Take all medicines as directed.  Make no changes unless your doctor suggests them.   Go to all your doctor visits.  Be sure to have all your required lab tests.     Major Treatments, Procedures and Findings:  You were provided with: a psychiatric assessment, assessed for medical stability, medication evaluation and/or management, group therapy and milieu management    Symptoms to Report: feeling more aggressive,  "increased confusion, losing more sleep, mood getting worse or thoughts of suicide    Early warning signs can include: increased depression or anxiety sleep disturbances increased thoughts or behaviors of suicide or self-harm  increased unusual thinking, such as paranoia or hearing voices    Safety and Wellness:  Take all medicines as directed.  Make no changes unless your doctor suggests them.      Follow treatment recommendations.  Refrain from alcohol and non-prescribed drugs.  Ask your support system to help you reduce your access to items that could harm yourself or others. If there is a concern for safety, call 911.    Resources:   Crisis Intervention: 446.397.6242 or 280-905-9360 (TTY: 390.140.5195).  Call anytime for help.  National [place] on Mental Illness (www.mn.[place]iana.org): 741.760.7039 or 273-926-9445.  Alcoholics Anonymous (www.alcoholics-anonymous.org): Check your phone book for your local chapter.  Suicide Awareness Voices of Education (SAVE) (www.save.org): 380-981-JDTR (3609)  National Suicide Prevention Line (www.mentalhealthmn.org): 395-280-PMGT (9524)  Mental Health Consumer/Survivor Network of [place] (www.mhcsn.net): 899.862.9055 or 063-693-9685  Mental Health Association of [place] (www.mentalhealth.org): 313.833.6297 or 757-401-6233  Self- Management and Recovery Training., SMART-- Toll free: 961.306.8373  www.[employer].org  [facility] Crisis Response 625 408-5232  Text 4 Life: txt \"LIFE\" to 53912 for immediate support and crisis intervention  Crisis text line: Text \"[place]\" to 692794. Free, confidential, 24/7.  Crisis Intervention: 900.288.5887 or 574-231-4799. Call anytime for help.     The treatment team has appreciated the opportunity to work with you.     If you have any questions or concerns our unit number is [phone]           "

## 2018-08-27 ENCOUNTER — ALLIED HEALTH/NURSE VISIT (OUTPATIENT)
Dept: PHARMACY | Facility: CLINIC | Age: 58
End: 2018-08-27
Attending: PSYCHIATRY & NEUROLOGY
Payer: COMMERCIAL

## 2018-08-27 DIAGNOSIS — E55.9 VITAMIN D DEFICIENCY: ICD-10-CM

## 2018-08-27 DIAGNOSIS — F33.2 SEVERE EPISODE OF RECURRENT MAJOR DEPRESSIVE DISORDER, WITHOUT PSYCHOTIC FEATURES (H): Primary | ICD-10-CM

## 2018-08-27 DIAGNOSIS — I10 BENIGN ESSENTIAL HYPERTENSION: ICD-10-CM

## 2018-08-27 LAB — INTERPRETATION ECG - MUSE: NORMAL

## 2018-08-27 PROCEDURE — 99605 MTMS BY PHARM NP 15 MIN: CPT | Mod: U4 | Performed by: PHARMACIST

## 2018-08-27 PROCEDURE — 99607 MTMS BY PHARM ADDL 15 MIN: CPT | Mod: U4 | Performed by: PHARMACIST

## 2018-08-27 NOTE — MR AVS SNAPSHOT
After Visit Summary   8/27/2018    Blanca Garduno    MRN: [id number]           Patient Information     Date Of Birth          1960        Visit Information        Provider Department      8/27/2018 1:00 PM Maddie Minor RPH [facility]        Today's Diagnoses     Severe episode of recurrent major depressive disorder, without psychotic features (H)    -  1    Benign essential hypertension        Vitamin D deficiency          Care Instructions    Recommendations from today's MTM visit:                                                    MTM (medication therapy management) is a service provided by a clinical pharmacist designed to help you get the most of out of your medicines.   Today we reviewed what your medicines are for, how to know if they are working, that your medicines are safe and how to make your medicine regimen as easy as possible.     1. Continue current medication.    2. Schedule an appointment with your primary care doctor for 3-4 weeks from now.    Next MTM visit: as needed; please call to schedule if you have any questions    To schedule another MTM appointment, please call the clinic directly or you may call the MTM scheduling line at [phone] or toll-free at [phone].     My Clinical Pharmacist's contact information:                                                      It was a pleasure seeing you today!  Please feel free to contact me with any questions or concerns you have.      Maddie Minor, PharmD  Medication Therapy Management Pharmacist  [facility]  Phone: [phone]    You may receive a survey about the MTM services you received.  I would appreciate your feedback to help me serve you better in the future. Please fill it out and return it when you can. Your comments will be anonymous.            Follow-ups after your visit        Who to contact     If you have questions or need follow up  "information about today's clinic visit or your schedule please contact [facility] directly at [phone].  Normal or non-critical lab and imaging results will be communicated to you by SwingPalhart, letter or phone within 4 business days after the clinic has received the results. If you do not hear from us within 7 days, please contact the clinic through SwingPalhart or phone. If you have a critical or abnormal lab result, we will notify you by phone as soon as possible.  Submit refill requests through [email or web address] or call your pharmacy and they will forward the refill request to us. Please allow 3 business days for your refill to be completed.          Additional Information About Your Visit        SwingPalhart Information     [email or web address] lets you send messages to your doctor, view your test results, renew your prescriptions, schedule appointments and more. To sign up, go to [email or web address] . Click on \"Log in\" on the left side of the screen, which will take you to the Welcome page. Then click on \"Sign up Now\" on the right side of the page.     You will be asked to enter the access code listed below, as well as some personal information. Please follow the directions to create your username and password.     Your access code is: [email or web address]  Expires: 2018  8:48 AM     Your access code will  in 90 days. If you need help or a new code, please call your [facility] clinic or [phone].        Care EveryWhere ID     This is your Care EveryWhere ID. This could be used by other organizations to access your [facility] medical records  [id number]         Blood Pressure from Last 3 Encounters:   18 126/79   10/20/10 151/94    Weight from Last 3 Encounters:   18 205 lb 11.2 oz (93.3 kg)   10/20/10 218 lb (98.9 kg)              Today, you had the following     No orders found for display       Primary Care Provider Office Phone # Fax #    Kendell Cristobal [phone] " [phone]       [facility] [address] [address]        Equal Access to Services     JEANETH ARENAS : Hadii bran Hathaway, wailanda chaim, qaybta kaalmada latonia, rohini manhansavera miranda. So [facility] [phone].    ATENCIÓN: Si habla español, tiene a peng disposición servicios gratuitos de asistencia lingüística. Llame al [phone].    We comply with applicable federal civil rights laws and [place] laws. We do not discriminate on the basis of race, color, national origin, age, disability, sex, sexual orientation, or gender identity.            Thank you!     Thank you for choosing [facility]  for your care. Our goal is always to provide you with excellent care. Hearing back from our patients is one way we can continue to improve our services. Please take a few minutes to complete the written survey that you may receive in the mail after your visit with us. Thank you!             Your Updated Medication List - Protect others around you: Learn how to safely use, store and throw away your medicines at www.disposemymeds.org.          This list is accurate as of 8/27/18  1:53 PM.  Always use your most recent med list.                   Brand Name Dispense Instructions for use Diagnosis    diphenhydrAMINE 25 MG capsule    BENADRYL     Take 25-50 mg by mouth nightly as needed for sleep        FLUoxetine 20 MG capsule    PROzac    60 capsule    Take 1 capsule (20 mg) by mouth daily    Severe episode of recurrent major depressive disorder, without psychotic features (H)       lisinopril 20 MG tablet    PRINIVIL/ZESTRIL     Take 20 mg by mouth daily        traZODone 50 MG tablet    DESYREL    30 tablet    Take 1 tablet (50 mg) by mouth At Bedtime    Severe episode of recurrent major depressive disorder, without psychotic features (H)       VITAMIN D (CHOLECALCIFEROL) PO      Take 4,000 Units by mouth daily

## 2018-08-27 NOTE — PATIENT INSTRUCTIONS
Recommendations from today's MTM visit:                                                    MTM (medication therapy management) is a service provided by a clinical pharmacist designed to help you get the most of out of your medicines.   Today we reviewed what your medicines are for, how to know if they are working, that your medicines are safe and how to make your medicine regimen as easy as possible.     1. Continue current medication.    2. Schedule an appointment with your primary care doctor for 3-4 weeks from now.    Next MTM visit: as needed; please call to schedule if you have any questions    To schedule another MTM appointment, please call the clinic directly or you may call the MTM scheduling line at [phone] or toll-free at [phone].     My Clinical Pharmacist's contact information:                                                      It was a pleasure seeing you today!  Please feel free to contact me with any questions or concerns you have.      Maddie iMnor, PharmD  Medication Therapy Management Pharmacist  [facility]  Phone: [phone]    You may receive a survey about the MTM services you received.  I would appreciate your feedback to help me serve you better in the future. Please fill it out and return it when you can. Your comments will be anonymous.

## 2018-08-27 NOTE — PROGRESS NOTES
SUBJECTIVE/OBJECTIVE:                Blanca Garduno is a 58 year old male called for a transitions of care visit.  He was discharged from [facility] on 8/25/18 for worsening depression.     Chief Complaint: med review    Allergies/ADRs: Reviewed in Epic  Tobacco: No tobacco use   Alcohol: not currently using  Caffeine: 1-2 cups/day of coffee  PMH: Reviewed in Epic    Medication Adherence/Access:  Per patient, misses medication 0 times per week.   Medication barriers: none.     Depression: Pt was recently admitted for depression, which had been worsening over the last few years.  Patient had recently stopped medications after feeling that they were not helpful.  He had been having some passive suicidal thoughts recently had written suicide notes to family members in the past. Pt is currently taking fluoxetine 20 mg once daily and will increase to 40 mg daily on 9/1/2018.  Patient stopped taking bupropion  mg once daily, citalopram 40 mg once daily, and lorazepam, which were discontinued during hospitalization.  Patient reported that motivation and energy have both improved over the last week, but that he has not felt a major change in his depressed mood overall. Pt denied any suicidal thinking.    He had been sleeping about 16 hours nightly and is now sleeping about 9 hours nightly.  He reported falling asleep easily, staying asleep during the night, and feeling rested upon waking.  He described feeling better with reduced total sleep during the day.  He takes diphenhydramine 25 mg nightly as needed and denied any side effects.  He was prescribed trazodone 50 mg nightly, which he is using on an as-needed basis.  He was also prescribed melatonin 3 mg nightly as needed, but did not get this medication upon discharge and has not been taking it.    Patient does not have psychiatrist and plans to follow-up with PCP (Dr. Posadas at [facility]) for med management at this time.  He may discuss psychiatry  care in the future.    Hypertension: Current medications include lisinopril 20mg daily.  Patient does not self-monitor BP.  Patient reports no current medication side effects.   Vitamin D Deficiency: Patient started taking vitamin D 4000 units daily upon hospital discharge.  Vitamin D level on 8/24/2018 was 17 micrograms/L.      Today's Vitals: There were no vitals taken for this visit.   BP Readings from Last 3 Encounters:   08/25/18 126/79   10/20/10 151/94     ASSESSMENT:                 Current medications were reviewed today.      Medication Adherence: good, no issues identified    Depression: Continue current medication.  Reviewed importance of taking medications daily and to discuss with provider if changes seem necessary.  Reviewed medication dosing and time to efficacy.  Patient will call to schedule PCP appointment for 3-4 weeks.  Discussed not needing melatonin at the current time as sleep seems stable-patient agreed.    Hypertension: Stable. Patient is meeting BP goal of < 140/90mmHg.   Vitamin D Deficiency: Continue current medication.  Patient will follow up with PCP.    PLAN:                  1.  Continue current medication.  2.  Patient will schedule with PCP.    I spent 30 minutes with this patient today. A copy of the visit note was provided to the patient's primary care provider.    Will follow up as needed.  Pt declined scheduling follow up at this time.  Writer's phone number was provided to patient.    The patient was mailed a summary of these recommendations as an after visit summary.    Maddie Minor, Skyler  Medication Therapy Management Pharmacist  [facility]  Phone: [phone]

## 2018-10-18 ENCOUNTER — OFFICE VISIT (OUTPATIENT)
Dept: PHARMACY | Facility: CLINIC | Age: 58
End: 2018-10-18
Payer: COMMERCIAL

## 2018-10-18 VITALS — HEART RATE: 74 BPM | SYSTOLIC BLOOD PRESSURE: 145 MMHG | DIASTOLIC BLOOD PRESSURE: 90 MMHG

## 2018-10-18 DIAGNOSIS — F33.1 MODERATE EPISODE OF RECURRENT MAJOR DEPRESSIVE DISORDER (H): Primary | ICD-10-CM

## 2018-10-18 DIAGNOSIS — G47.09 OTHER INSOMNIA: ICD-10-CM

## 2018-10-18 PROCEDURE — 99607 MTMS BY PHARM ADDL 15 MIN: CPT | Performed by: PHARMACIST

## 2018-10-18 PROCEDURE — 99605 MTMS BY PHARM NP 15 MIN: CPT | Performed by: PHARMACIST

## 2018-10-18 RX ORDER — LORAZEPAM 0.5 MG/1
0.5 TABLET ORAL DAILY PRN
COMMUNITY

## 2018-10-18 NOTE — PROGRESS NOTES
"SUBJECTIVE/OBJECTIVE:                Blanca Garduno is a 58 year old male coming in for a follow up transitions of care visit.  He was discharged from [facility] on 8/25/18 for worsening depression.     Chief Complaint: \"Prozac isn't working\"    Allergies/ADRs: Reviewed in Epic  Tobacco: No tobacco use   Alcohol: not currently using  Caffeine: 1-2 cups/day of coffee  PMH: Reviewed in Epic    Medication Adherence/Access:  Per patient, misses medication 0 times per week.   Medication barriers: none.     Insomnia: Pt was prescribed trazodone 50mg at bedtime during hospitalization, which he does find helpful, but causes him to feel very tired in the morning, so he does not take it regularly.  Patient also has diphenhydramine 25 mg which he has not taken in the recent past.  He reported sleep has been mostly stable and does not typically have trouble falling or staying asleep during the night.    Depression: Patient reportedly noticed improvement upon hospital discharge on 8/25 after starting fluoxetine during hospitalization (bupropion and citalopram were DCd).  He was discharged on fluoxetine 20 mg daily with instructions to increase to 40 mg daily.  After increasing to 40 mg, he experienced significantly increased anxiety and agitation and reduced the dose back to 20 mg daily, though did not experience much mood benefit at this dose.  He has been trying to increase back to 40 mg daily but recently had a \"36-hour panic attack\" about a week after increasing the dose.  He reportedly felt \"very wired and unable to focus\" during that time.  Patient reportedly feels that energy, interest in activities and motivation are generally improved as compared to before hospitalization, but he still feels \"depressed all the time,\" which easily worsens by other stressors.    He endorsed having passive thoughts of self-harm daily, which sometimes escalates and creates \"an nest\" in his head, during which time he continually " "thinks about a plan.  Patient reported today that he has thought of multiple plans in the past, but denied any intent to act on them at this time.  Patient wrote \"suicide letters\" to his wife and daughters in 2015 which he keeps at home.  He reportedly thinks he should get rid of them, but has not been able to do so.  He acknowledged that he would present to the ED should he truly feel unsafe.  He denied having access to weapons.  Patient does see therapist every couple weeks, which he finds helpful.    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  He saw a psychiatrist by virtual visit in [place], which precipitated recent hospital admission.  Other psychiatric experiences include hospitalization at [facility] a few years ago which was \"completely awful.\"  Pt described not getting along with the psychiatrist and was discharged in \"22 hours because they didn't take my insurance.\"  He sees Dr. Posadas at [facility] for primary care, who is currently prescribing his medications.  He is interested in establishing psychiatry care.    Past medications:  Amitriptyline- college  Paroxetine- 2000, anxiety in certain situations, worked very well  Bupropion started 2015 in addition to paroxetine- didn't help; DC in hospital  Citalopram- thinks it was never very helpful; DC in hospital    Today's Vitals: /90  Pulse 74   BP Readings from Last 3 Encounters:   10/18/18 145/90   08/25/18 126/79   10/20/10 151/94     ASSESSMENT:                 Current medications were reviewed today.      Medication Adherence: good, no issues identified    Depression/Insomnia: Continue trazodone as needed for sleep.  Patient has tried 3 SSRIs and may benefit from switching to a different class.  " Consider switching fluoxetine to venlafaxine.  Reviewed potential venlafaxine side effects.  As fluoxetine has a long half-life, it could be stopped at the 20 mg dose and venlafaxine ER 75mg daily initiated approximately 3 days later.  Psychiatry referral placed to [facility] for intake scheduling.  Provided patient with clinic phone number for follow-up.    PLAN:                  1.  Consider switching fluoxetine to venlafaxine.  Patient could stop fluoxetine and start venlafaxine ER 75mg daily approximately 3 days later.    I spent 60 minutes with this patient today. A copy of the visit note was provided to the patient's primary care provider.    The patient was mailed a summary of these recommendations as an after visit summary.    Maddie Minor PharmD  Medication Therapy Management Pharmacist  [facility]  Phone: [phone]

## 2018-10-18 NOTE — PATIENT INSTRUCTIONS
Recommendations from today's MTM visit:                                                    MTM (medication therapy management) is a service provided by a clinical pharmacist designed to help you get the most of out of your medicines.   Today we reviewed what your medicines are for, how to know if they are working, that your medicines are safe and how to make your medicine regimen as easy as possible.     1. I will talk to Dr. Posadas about switching fluoxetine to venlafaxine (Effexor)    2. I will place a referral for psychiatry care at this clinic.  You can call [facility] at [phone] and ask for Intake.    Next MTM visit: will schedule once I hear back from Dr. Posadas    To schedule another MTM appointment, please call the clinic directly or you may call the MTM scheduling line at [phone] or toll-free at [phone].     My Clinical Pharmacist's contact information:                                                      It was a pleasure seeing you today!  Please feel free to contact me with any questions or concerns you have.      Maddie Minor, PharmD  Medication Therapy Management Pharmacist  [facility]  Phone: [phone]    You may receive a survey about the MTM services you received.  I would appreciate your feedback to help me serve you better in the future. Please fill it out and return it when you can. Your comments will be anonymous.

## 2018-10-18 NOTE — MR AVS SNAPSHOT
After Visit Summary   10/18/2018    Blanca Garduno    MRN: [id number]           Patient Information     Date Of Birth          1960        Visit Information        Provider Department      10/18/2018 10:30 AM Maddie Minor RPH [facility]        Care Instructions    Recommendations from today's MTM visit:                                                    MTM (medication therapy management) is a service provided by a clinical pharmacist designed to help you get the most of out of your medicines.   Today we reviewed what your medicines are for, how to know if they are working, that your medicines are safe and how to make your medicine regimen as easy as possible.     1. I will talk to Dr. Posadas about switching fluoxetine to venlafaxine (Effexor)    2. I will place a referral for psychiatry care at this clinic.  You can call [facility] at [phone] and ask for Intake.    Next MTM visit: will schedule once I hear back from Dr. Posadas    To schedule another MTM appointment, please call the clinic directly or you may call the MTM scheduling line at [phone] or toll-free at [phone].     My Clinical Pharmacist's contact information:                                                      It was a pleasure seeing you today!  Please feel free to contact me with any questions or concerns you have.      Maddie Minor, Skyler  Medication Therapy Management Pharmacist  [facility]  Phone: [phone]    You may receive a survey about the MTM services you received.  I would appreciate your feedback to help me serve you better in the future. Please fill it out and return it when you can. Your comments will be anonymous.            Follow-ups after your visit        Who to contact     If you have questions or need follow up information about today's clinic visit or your schedule please contact [facility]  "PSYCHIAT[facility] directly at [phone].  Normal or non-critical lab and imaging results will be communicated to you by BeCouplyhart, letter or phone within 4 business days after the clinic has received the results. If you do not hear from us within 7 days, please contact the clinic through BeCouplyhart or phone. If you have a critical or abnormal lab result, we will notify you by phone as soon as possible.  Submit refill requests through Online Agility or call your pharmacy and they will forward the refill request to us. Please allow 3 business days for your refill to be completed.          Additional Information About Your Visit        BeCouplyharFlowBelow Aero Information     Online Agility lets you send messages to your doctor, view your test results, renew your prescriptions, schedule appointments and more. To sign up, go to [email or web address] . Click on \"Log in\" on the left side of the screen, which will take you to the Welcome page. Then click on \"Sign up Now\" on the right side of the page.     You will be asked to enter the access code listed below, as well as some personal information. Please follow the directions to create your username and password.     Your access code is: [email or web address]  Expires: 2019 10:31 AM     Your access code will  in 90 days. If you need help or a new code, please call your [facility] clinic or [phone].        Care EveryWhere ID     This is your Care EveryWhere ID. This could be used by other organizations to access your [facility] medical records  [id number]         Blood Pressure from Last 3 Encounters:   18 126/79   10/20/10 151/94    Weight from Last 3 Encounters:   18 205 lb 11.2 oz (93.3 kg)   10/20/10 218 lb (98.9 kg)              Today, you had the following     No orders found for display       Primary Care Provider Office Phone # Fax #    Kendell Cristobal [phone]       [address] [address]        Equal Access to Services     " JEANETH ARENAS : Hadii aad ku wilfred Somarylinali, waaxda luqadaha, qaybta kaalmada adefavian, rohini mj benyprimo manhansavera garzon . So [facility] [phone].    ATENCIÓN: Si habla español, tiene a peng disposición servicios gratuitos de asistencia lingüística. Llame al [phone].    We comply with applicable federal civil rights laws and [place] laws. We do not discriminate on the basis of race, color, national origin, age, disability, sex, sexual orientation, or gender identity.            Thank you!     Thank you for choosing [facility]  for your care. Our goal is always to provide you with excellent care. Hearing back from our patients is one way we can continue to improve our services. Please take a few minutes to complete the written survey that you may receive in the mail after your visit with us. Thank you!             Your Updated Medication List - Protect others around you: Learn how to safely use, store and throw away your medicines at www.disposemymeds.org.          This list is accurate as of 10/18/18 11:23 AM.  Always use your most recent med list.                   Brand Name Dispense Instructions for use Diagnosis    diphenhydrAMINE 25 MG capsule    BENADRYL     Take 25-50 mg by mouth nightly as needed for sleep        FLUoxetine 20 MG capsule    PROzac    60 capsule    Take 1 capsule (20 mg) by mouth daily    Severe episode of recurrent major depressive disorder, without psychotic features (H)       lisinopril 20 MG tablet    PRINIVIL/ZESTRIL     Take 20 mg by mouth daily        LORazepam 0.5 MG tablet    ATIVAN     Take 0.5 mg by mouth daily as needed for anxiety (flying)        traZODone 50 MG tablet    DESYREL    30 tablet    Take 1 tablet (50 mg) by mouth At Bedtime    Severe episode of recurrent major depressive disorder, without psychotic features (H)       VITAMIN D (CHOLECALCIFEROL) PO      Take 4,000 Units by mouth daily

## 2018-11-07 RX ORDER — VENLAFAXINE HYDROCHLORIDE 75 MG/1
75 CAPSULE, EXTENDED RELEASE ORAL DAILY
Qty: 30 CAPSULE | Refills: 1 | COMMUNITY
Start: 2018-11-07 | End: 2019-01-02

## 2018-11-07 NOTE — PROGRESS NOTES
Spoke with Dr. Posadas's nurse, Kiley, who relayed the message that Dr. Posadas is in agreement with switching fluoxetine to venlafaxine per instructions in the Plan.  Called patient to relay information and reviewed possible side effects.  Rx called to Jailene carrera [facility] in [place].  Patient needs to check his schedule and will call back next week to schedule follow up.    Maddie Minor, PharmD  Medication Therapy Management Pharmacist  [facility]  Phone: [phone]

## 2018-11-13 ENCOUNTER — TELEPHONE (OUTPATIENT)
Dept: PSYCHIATRY | Facility: CLINIC | Age: 58
End: 2018-11-13

## 2018-11-13 NOTE — TELEPHONE ENCOUNTER
PSYCHIATRY CLINIC PHONE INTAKE     SERVICES REQUESTED / INTERESTED IN          Med Management    Presenting Problem and Brief History                              What would you like to be seen for? (brief description):  Patient reports having depression for about 38 years but has been on medication for the past 3 years. Patient does not feel any meds he has tried have been effective. Patient was most recently prescribed effexor on 11/11/18 and in the past has tried prozac, bupropion, and celexa. Pharmacist at [facility] did a medication evaluation and updated patient's PCP after making decision for what may be best.  Have you received a mental health diagnosis? Yes   Which one (s): Depression  Is there any history of developmental delay?  No   Are you currently seeing a mental health provider?  Yes            Who / month last seen:  Maddie Minor at [facility]. Also has a therapist outside of [facility] system  Do you have mental health records elsewhere?  Yes  Will you sign a release so we can obtain them?  Yes    Have you ever been hospitalized for psychiatric reasons?  Yes  Describe: August 2018 - med monitoring video meeting with psychiatrist. Provider felt patient's answers were alarming so he was admitted for two nights.    Do you have current thoughts of self-harm?  No    Do you currently have thoughts of harming others?  No       Substance Use History     Do you have any history of alcohol / illicit drug use?  No    Have you ever received treatment for this?  No         Social History     Does the patient have a guardian?  No      Have you had an ACT team in last 12 months?  No    Do you have any current or past legal issues?  No    OK to leave a detailed voicemail?  Yes    Medical/ Surgical History                                   Patient Active Problem List   Diagnosis     Suicidal ideation          Medications             Current Outpatient Prescriptions   Medication Sig Dispense Refill     diphenhydrAMINE  (BENADRYL) 25 MG capsule Take 25-50 mg by mouth nightly as needed for sleep       lisinopril (PRINIVIL/ZESTRIL) 20 MG tablet Take 20 mg by mouth daily       LORazepam (ATIVAN) 0.5 MG tablet Take 0.5 mg by mouth daily as needed for anxiety (flying)       traZODone (DESYREL) 50 MG tablet Take 1 tablet (50 mg) by mouth At Bedtime 30 tablet 1     venlafaxine (EFFEXOR-XR) 75 MG 24 hr capsule Take 1 capsule (75 mg) by mouth daily 30 capsule 1     VITAMIN D, CHOLECALCIFEROL, PO Take 4,000 Units by mouth daily           DISPOSITION      Completed phone screen with patient. Scheduled with Bernardo Javier CNP.    Kinza Mckeon,

## 2018-12-11 ENCOUNTER — TELEPHONE (OUTPATIENT)
Dept: PHARMACY | Facility: CLINIC | Age: 58
End: 2018-12-11

## 2018-12-11 NOTE — TELEPHONE ENCOUNTER
"Pt called to report that he is \"having a mental health crisis\" and plans to go to the hospital this evening to request admission.  He noted that he feels venlafaxine has been helpful overall since starting it about one month ago.  He reported not feeling safe at home and plans to go to [facility] this afternoon.  Pt was encouraged to do so.  He will return call with update on his health and to reschedule follow up, as appropriate.  He is scheduled for evaluation with Bernardo NUNEZ CNP on 1/2/19.    Maddie Minor, PharmD  Medication Therapy Management Pharmacist  [facility]  Phone: [phone]  "

## 2019-01-02 ENCOUNTER — OFFICE VISIT (OUTPATIENT)
Dept: PSYCHIATRY | Facility: CLINIC | Age: 59
End: 2019-01-02
Attending: NURSE PRACTITIONER
Payer: COMMERCIAL

## 2019-01-02 VITALS
WEIGHT: 212.4 LBS | SYSTOLIC BLOOD PRESSURE: 165 MMHG | BODY MASS INDEX: 30.48 KG/M2 | DIASTOLIC BLOOD PRESSURE: 94 MMHG | HEART RATE: 66 BPM

## 2019-01-02 DIAGNOSIS — F33.1 MODERATE EPISODE OF RECURRENT MAJOR DEPRESSIVE DISORDER (H): ICD-10-CM

## 2019-01-02 PROCEDURE — G0463 HOSPITAL OUTPT CLINIC VISIT: HCPCS | Mod: ZF

## 2019-01-02 RX ORDER — VENLAFAXINE HYDROCHLORIDE 37.5 MG/1
37.5 CAPSULE, EXTENDED RELEASE ORAL DAILY
Qty: 30 CAPSULE | Refills: 0 | Status: SHIPPED | OUTPATIENT
Start: 2019-01-02 | End: 2019-02-07

## 2019-01-02 RX ORDER — VENLAFAXINE HYDROCHLORIDE 75 MG/1
75 CAPSULE, EXTENDED RELEASE ORAL DAILY
Qty: 30 CAPSULE | Refills: 1 | Status: SHIPPED | OUTPATIENT
Start: 2019-01-02

## 2019-01-02 ASSESSMENT — PAIN SCALES - GENERAL: PAINLEVEL: NO PAIN (0)

## 2019-01-02 NOTE — PROGRESS NOTES
"  Psychiatry Clinic Medical Diagnostic Assessment               Blanca Garduno is a 58 year old male who presents to the clinic to establish psychiatric care  Therapist: Uziel Callaway  PCP: Kendell Cristobal  Other Providers: None  Referred by OC Minor for evaluation of depression and anxiety.      History was provided by patient who was a good historian.     Chief Complaint                                                                                                             \" Ongoing thoughts of harming myself \"     History of Present Illness                                                                                 4, 4      Psych critical item history includes suicide attempt [single], suicidal ideation, mutiple psychotropic trials and psych hosp (<3) .       Most recent history:  Blanca has a history of depression with recent psychiatric hospitalizations and regular psychotherapy.  Symptoms significantly worsened in 2014 and started psychiatric medications in 2015.  He did not experience any symptom resolution after the introduction of antidepressants.  Symptoms worsened over the summer of 2018.  Blanca has been hospitalized twice for worsening depression (2015 and 2018).  Late June 2015, Blanca inadvertently sent suicide note over social media while he was away on business in [place], [place].  He admitted himself to [facility] a week later.  Blanca reports having a very poor experience while hospitalized and was discharged after approximately 24 hours.  He then went to [facility] and overdosed on pills, which is his only suicide attempt.  Went to psychiatric provider and was prescribed Wellbutrin and was not helpful.  Started Prozac while hospitalized in August and took for two months and found ineffective and experienced significant side effects.  After viewing a commercial, Blanca requested a trial of Effexor.  To date, Blanca believes Effexor 75mg has been helpful as he has been able to cope with " "previously triggering events.  Continues to endorse suicidal ideation with plan (overdose) but no intent.  Blanca reports everything in his life is a \"10.\"  He reports having a great job and a great family but continues to experience persistent SI.    Benadryl 25mg used for sleep promotion.  Trazodone not helpful.      Pertinent Background:  Blanca reports he has experienced depression since college  He has also experienced years of symptom resolution for as much as a decade.  Trial of Amitriptyline and Haldol in 1980.  Blanca reports at this time he was confusing his inner monologue with possible psychosis.  Blanca experienced significant anxiety in 2000, especially when crossing bridges.  Zoloft initially prescribed and not helpful.  Paxil was prescribed and anxiety symptoms resolved.    He does endorse calling suicide hotline once in college and approximately 2 years ago.        Symptoms worsened in 2008 after death of mother, dog, and bankruptcy of place of employment.      No history of psychosis, mica, ADHD and/or OCD.  No history of head trauma with loss of consciousness or seizures.      Recent Symptoms:   Depression:  suicidal ideation with plan, without intent, depressed mood, anhedonia, low energy, feeling worthless and feeling hopeless  Elevated:  none  Psychosis:  none  Anxiety:  nervous/overwhelmed  Panic Attack:  none  Trauma Related:  none       Recent Substance Use:  Alcohol- yes, 3 beers daily , Tobacco- no , Caffeine- coffee/ tea [5 cups per day], Opioids- no    Narcan Kit- N/A , Cannabis- no  and Other Illicit Drugs-cocaine once per year     Substance Use History                                                                 Past Use- Cannabis- \"smoked a ton of pot in college.\"         Psychiatric History     Suicidal ideation   Suicide Attempt:   #- 1   Most Recent- 08/2018  Psych Hosp- see history       Psychiatric Medication Trials     Prozac (fluoxetine), Zoloft (sertraline), Paxil " (paroxetine), Celexa (citalopram) and Wellbutrin, Zyban, Aplenzin (bupropion)  Haldol (haloperidol)                                                       OR this and delete the other    Drug /  Start Date Dose (mg) Helpful Adverse Effects   DC Reason / Date                          Social/ Family History               [per patient report]                                                  1ea, 1ea     FINANCIAL SUPPORT- working.  Radio consultant   CHILDREN- 2 adopted daughters from [place].       LIVING SITUATION- Lives with family in [place], [place]      LEGAL- None  EARLY HISTORY/ EDUCATION- Grew up in [place].  Graduated High School. Attended 7 years of college but did not graduate  SOCIAL/ SPIRITUAL SUPPORT- Wife       TRAUMA HISTORY (self-report)- None  FEELS SAFE AT HOME- Yes  FAMILY HISTORY-  Mother possibly diagnosed with BPD    Medical / Surgical History                                                                                                                     Patient Active Problem List   Diagnosis     Suicidal ideation       No past surgical history on file.     Medical Review of Systems                                                                                                     2, 10     A comprehensive review of systems was performed and is negative other than noted in the HPI.  Currently taking Lisinopril 20mg for hypertension    Allergy                                Patient has no known allergies.  Current Medications                                                                                                         Current Outpatient Medications   Medication Sig Dispense Refill     diphenhydrAMINE (BENADRYL) 25 MG capsule Take 25-50 mg by mouth nightly as needed for sleep       lisinopril (PRINIVIL/ZESTRIL) 20 MG tablet Take 20 mg by mouth daily       LORazepam (ATIVAN) 0.5 MG tablet Take 0.5 mg by mouth daily as needed for anxiety (flying)       venlafaxine (EFFEXOR-XR) 75 MG 24 hr  capsule Take 1 capsule (75 mg) by mouth daily 30 capsule 1     VITAMIN D, CHOLECALCIFEROL, PO Take 4,000 Units by mouth daily       traZODone (DESYREL) 50 MG tablet Take 1 tablet (50 mg) by mouth At Bedtime (Patient not taking: Reported on 1/2/2019) 30 tablet 1     Vitals                                                                                                                         3, 3     BP (!) 165/94 (BP Location: Right arm, Patient Position: Sitting, Cuff Size: Adult Large)   Pulse 66   Wt 96.3 kg (212 lb 6.4 oz)   BMI 30.48 kg/m       Mental Status Exam                                                                                      9, 14 cog gs     Alertness: alert  and oriented  Appearance: casually groomed  Behavior/Demeanor: cooperative and pleasant, with good  eye contact   Speech: normal  Language: intact  Psychomotor: normal or unremarkable  Mood: depressed  Affect: appropriate; was congruent to mood; was congruent to content  Thought Process/Associations: unremarkable  Thought Content:  Reports suicidal ideation with plan; without intent [details in Interim History];  Denies violent ideation  Perception:  Reports none;  Denies auditory hallucinations and visual hallucinations  Insight: good  Judgment: adequate for safety  Cognition: (6) does  appear grossly intact; formal cognitive testing was not done  Gait and Station: unremarkable    Labs and Data                                                                                                                     Rating Scales:   PHQ9 and CAGE AIDE 1. Have you ever felt that you outght to cut down on your drinking or drug use?  no  2. Have people annoyed you by criticizing your drinking or drug use? yes  3. Have you ever felt bad or guilty about your drinking or drug use?  no  4. Have you ever had a drink or used drugs first thing in the morning to steady your nerves or to get rid of a hangover?  no    PHQ9 Today:  12  No flowsheet data  found.      Recent Labs   Lab Test 08/24/18  0849   CR 0.97   GFRESTIMATED 79     Recent Labs   Lab Test 08/24/18  0849   AST 18   ALT 25   ALKPHOS 58       Diagnosis and Assessment                                                                             m2, h3     Today the following issues were addressed:    1) Major Depressive Disorder, recurrent, moderate-severe with persistent suicidal ideation    [place] Prescription Monitoring Program [] was checked today:  not using controlled substances.        Plan                                                                                                                     m2, h3     1) Medication Management  Increase Effexor XR to 112.5mg.  Blanca has elevated BP and reports sensitivity to medication increases.    Continue Benadryl 25mg at bedtime   Continue Vitamin D supplementation    2) Therapy  Continue with current therapist    3) MTM consultation  Scheduled for 2/6/19      RTC: 1 month    CRISIS NUMBERS:   Provided routinely in AVS.    Treatment Risk Statement:  The patient understands the risks, benefits, adverse effects and alternatives. Agrees to treatment with the capacity to do so. No medical contraindications to treatment. Agrees to call clinic for any problems. The patient understands to call 911 or go to the nearest ED if life threatening or urgent symptoms occur.     WHODAS 2.0  TODAY total score = N/A; [a 12-item WHODAS 2.0 assessment was not completed by the pt today and/or recorded in EPIC].     PROVIDER:  ENRIQUE Almonte CNP

## 2019-01-02 NOTE — NURSING NOTE
Chief Complaint   Patient presents with     Eval/Assessment     MDD     BP & P x2 - Provider notified

## 2019-01-02 NOTE — PATIENT INSTRUCTIONS
Thank you for coming to the PSYCHIATRY CLINIC.    Lab Testing:  If you had lab testing today and your results are reassuring or normal they will be mailed to you or sent through [facility] within 7 days.   If the lab tests need quick action we will call you with the results.  The phone number we will call with results is # [phone] (home) [phone] (work). If this is not the best number please call our clinic and change the number.    Medication Refills:  If you need any refills please call your pharmacy and they will contact us. Our fax number for refills is [phone]. Please allow three business for refill processing.   If you need to  your refill at a new pharmacy, please contact the new pharmacy directly. The new pharmacy will help you get your medications transferred.     Scheduling:  If you have any concerns about today's visit or wish to schedule another appointment please call our office during normal business hours [phone] (8-5:00 M-F)    Contact Us:  Please call [phone] during business hours (8-5:00 M-F).  If after clinic hours, or on the weekend, please call  [phone].    Financial Assistance [phone]  [employer] Billing [phone]  [facility] Billing [phone]  Medical Records [phone]      MENTAL HEALTH CRISIS NUMBERS:  [facility]:   [facility] - [phone]   Crisis Residence [facility] - [phone]   Walk-In Counseling [facility] [phone]   COPE 24/7 [facility] Team for Adults - [[phone]]; Child - [[phone]]     Crisis Connection - [phone]     [facility] - [phone]   Walk-in counseling [facility] - [phone]   Walk-in counseling [facility] - [phone]   Crisis Residence [facility] - [phone]   Urgent Care Adult Mental Health:   --Drop-in, 24/7 crisis line, and [facility] Mobile Team  [[phone]]    CRISIS TEXT LINE: Text [phone] from anywhere, anytime, any crisis 24/7;    OR SEE www.crisistextline.org     Poison Control Center - [phone]    CHILD: [facility] needs assessment team - [phone]     [facility] - [phone]; or [facility] - [phone]    If you have a medical emergency please call 911or go to the nearest ER.                    _____________________________________________    Again thank you for choosing PSYCHIATRY CLINIC and please let us know how we can best partner with you to improve you and your family's health.  You may be receiving a survey in the mail regarding this appointment. We would love to have your feedback, both positive and negative, so please fill out the survey and return it using the provided envelope. The survey is done by an external company, so your answers are anonymous.

## 2019-01-17 ENCOUNTER — TELEPHONE (OUTPATIENT)
Dept: PSYCHIATRY | Facility: CLINIC | Age: 59
End: 2019-01-17

## 2019-01-17 NOTE — TELEPHONE ENCOUNTER
"Pt called the clinic to report increased \"depressive thoughts and feelings.\" Pt stated that these started several days ago, pt thinks this is due to increasing effexor. Pt stated he increased the effexor about 1 week ago. Pt described the depression as \"incapacitating, I'm exhausted all the time and only think about my depression.\" Pt did not experience this when he was only taking 75mg of effexor.    Pt endorsed passive suicidal thoughts. Pt denied having a plan or means and stated \"everything is just passive.\" Writer provided pt with after hours clinic number. Writer directed pt to call 911 or go to the emergency room for further evaluation should passive thoughts become active or if pt felt as though he could not keep himself safe. Pt agreed and verbalized understanding.    Routed to provider.   "

## 2019-01-17 NOTE — TELEPHONE ENCOUNTER
Writer consulted with provider, recommendations given to decrease dose back to effexor 75mg to see if depressive thoughts improve. Pt to schedule follow-up appt with the provider.    Writer called pt back, pt answered and asked writer to speak with wife as he was resting. Writer passed along recommendations to decrease medication back to 75mg and to follow-up in several days if depressive thoughts did not improve. Pt has follow-up appt scheduled for 2/6/19. Pt will follow-up in several days to determine if he wants to schedule an earlier follow-up.

## 2019-01-23 ASSESSMENT — PATIENT HEALTH QUESTIONNAIRE - PHQ9: SUM OF ALL RESPONSES TO PHQ QUESTIONS 1-9: 12

## 2019-02-07 DIAGNOSIS — F33.1 MODERATE EPISODE OF RECURRENT MAJOR DEPRESSIVE DISORDER (H): ICD-10-CM

## 2019-02-08 RX ORDER — VENLAFAXINE HYDROCHLORIDE 37.5 MG/1
37.5 CAPSULE, EXTENDED RELEASE ORAL DAILY
Qty: 30 CAPSULE | Refills: 0 | Status: SHIPPED | OUTPATIENT
Start: 2019-02-08 | End: 2019-02-08

## 2019-02-08 RX ORDER — VENLAFAXINE HYDROCHLORIDE 37.5 MG/1
37.5 CAPSULE, EXTENDED RELEASE ORAL DAILY
Qty: 12 CAPSULE | Refills: 0 | Status: SHIPPED | OUTPATIENT
Start: 2019-02-08

## 2019-02-08 NOTE — TELEPHONE ENCOUNTER
Medication requested: venlafaxine (EFFEXOR XR) 37.5 MG 24 hr capsule  Last refilled: 1/2/19  Qty: 30      Last seen: 1/2/19  RTC: 1 month  Cancel: 1  No-show: 0  Next appt: not scheduled     Refill decision:   Refill pended and routed to the provider for review/determination due to cancel x 1  Scheduling has been notified to contact the pt for appointment.

## 2019-02-08 NOTE — TELEPHONE ENCOUNTER
Bernardo Javier, Dilcia Boyd CNP, RN   Caller: Unspecified (Yesterday,  6:08 PM)              Sofiya Ha,     I'm ok with one refill but he needs to get back to clinic before I'll authorize again     Bernardo Orantesr called pt to schedule follow-up appt. Appt scheduled for 2/20 at 10am- placed on hold and sent to scheduling.     Writer sent 30 d/s of medication per provider approval to pt's preferred pharmacy.

## 2019-02-08 NOTE — TELEPHONE ENCOUNTER
Provider requested writer call and update authorized quantity to be dispensed. Quantity updated to 12 capsules, writer called [facility] #[phone] to update order and writer resent order electronically.

## 2019-03-04 DIAGNOSIS — F33.1 MODERATE EPISODE OF RECURRENT MAJOR DEPRESSIVE DISORDER (H): ICD-10-CM

## 2019-03-06 NOTE — TELEPHONE ENCOUNTER
Medication requested: venlafaxine (EFFEXOR-XR) 75 MG 24 hr capsule  Last refilled: 2/21/19  Qty: 30      Last seen: 1/2/19  RTC: 1 month  Cancel: 2  No-show: 0  Next appt: not scheduled    Refill decision:   Refill pended and routed to the provider for review/determination due to CANCEL X 2  Pt outside of RTC timeframe.  Scheduling has been notified to contact the pt for appointment.

## 2019-03-08 NOTE — TELEPHONE ENCOUNTER
Bernardo Javier APRN CNP   You 16 hours ago (5:36 PM)      Patient needs to be scheduled before I will renew.     thanks    Routing Comment       Pauline Morales RN O'Donnell, Jon M, APRN CNP 2 days ago      Refill pended and routed to the provider for review/determination due to CANCEL X 2   Pt outside of RTC timeframe.   Scheduling has been notified to contact the pt for appointment.          Routing Comment      -Writer called pt and left message informing pt that they needed to call the clinic to schedule follow-up appointment prior to medication being refilled.

## 2019-03-21 RX ORDER — VENLAFAXINE HYDROCHLORIDE 75 MG/1
75 CAPSULE, EXTENDED RELEASE ORAL DAILY
Qty: 30 CAPSULE | Refills: 1 | OUTPATIENT
Start: 2019-03-21

## 2025-02-05 ENCOUNTER — PATIENT OUTREACH (OUTPATIENT)
Dept: CARE COORDINATION | Facility: CLINIC | Age: 65
End: 2025-02-05

## 2025-02-05 NOTE — PROGRESS NOTES
Clinical Product Navigator RN reviewed chart; patient on payer product coverage.  Review results:   CPN Initial Information Gathering  Referral Source: Health Plan    Patient identified by their health plan for RN Clinical Product Navigator review.  Patient admitted to [facility] behavioral health unit on 2/2/25.  Patient utilizes external health care systems for primary care and is therefore, not a candidate for care coordination.     Melissa Behl BSN, RN, PHN, CCM  RN Clinical Product Navigator  [phone]